# Patient Record
Sex: FEMALE | Race: ASIAN | NOT HISPANIC OR LATINO | Employment: FULL TIME | URBAN - METROPOLITAN AREA
[De-identification: names, ages, dates, MRNs, and addresses within clinical notes are randomized per-mention and may not be internally consistent; named-entity substitution may affect disease eponyms.]

---

## 2020-03-29 ENCOUNTER — VIRTUAL VISIT (OUTPATIENT)
Dept: FAMILY MEDICINE | Facility: OTHER | Age: 25
End: 2020-03-29

## 2020-03-29 NOTE — PROGRESS NOTES
"Date: 2020 11:56:07  Clinician: EVERARDO Montgomery  Clinician NPI: [id number]  Patient: Pollo Paige  Patient : 1995  Patient Address: [address] [place] [address], [address], [place] [address]  Patient Phone: [phone]  Visit Protocol: UTI  Patient Summary:  Pollo is a 25 year old ( : 1995 ) female who initiated a Visit for a presumed bladder infection. When asked the question \"Please sign me up to receive news, health information and promotions from [employer].\", Pollo responded \"Yes\".   A synchronous visit is necessary because the patient reported the following abnormal symptoms:   Hematuria and not menstruating (requires clarification)   Her symptoms started 1-3 days ago and consist of dysuria, abdominal pain, urinary frequency, and urinary incontinence.   Symptom details     Urine color: The color of her urine is pink or red. She is not currently menstruating.     Abdominal pain: The pain is moderate (4-6 on a 10 point pain scale).      Denied symptoms include foul-smelling urine, nausea, feeling as if the bladder is never empty, flank pain, chills, vaginal discharge, urinary urgency, vomiting, and vaginal itching. She does not feel feverish.   Pollo has not used any over-the-counter medications or home remedies to relieve her current symptoms.  Precipitating events  Pollo denies having a sexually transmitted disease.  Pertinent medical history  Pollo has had a bladder infection before and has had 1 in the past 12 months. Her most recent bladder infection was not within the last 4 weeks. Her current symptoms are similar to her previous bladder infection symptoms.   She is not sure what antibiotics have been effective in treating her past bladder infections.   Pollo has not been prescribed antibiotics to prevent frequent or repeated bladder infections in the past and does not get yeast infections when she takes antibiotics. She has not experienced problems or side effects " with any of the common antibiotics used to treat bladder infections.   Pollo does not have a history of kidney stones. She has not used a catheter or been a patient in a hospital or nursing home in the past 2 weeks.   Pollo does not smoke or use smokeless tobacco.   She denies pregnancy and denies breastfeeding. She has menstruated in the past month.   Additional information as reported by the patient (free text): pain and bleeding when urinating; I had a similar problem in July 2019(I went to the provider after 4-5 days then, the infection had gone by then), I had an yeast infection in   October 2019.     MEDICATIONS: No current medications, ALLERGIES: NKDA  Clinician Response:  Dear Pollo,  Based on the information you have provided, you likely have an acute urinary tract infection, also called a bladder infection. Bladder infections occur when bacteria from the outside of the body enters the urinary tract. Any part of the urinary system can be infected, but the bladder is the most common.  Medication information  I am prescribing:     Nitrofurantoin monohyd/m-cryst (Macrobid) 100 mg oral capsule. Take 1 capsule by mouth every 12 hours for 5 days. Take this medication with food. There are no refills with this prescription.   The medication I prescribed for your bladder infection is an antibiotic. Continue taking the medication until it is gone even if you feel better.   Yeast infections can be a common side effect of antibiotics. The most common symptom of a yeast infection is itchiness in and around the vagina. Other signs and symptoms include burning, redness, or a thick, white vaginal discharge that looks like cottage cheese and does not have a bad smell.  Self care  Urination helps to flush bacteria from the urinary tract. For this reason, drinking water and urinating often helps relieve some urinary symptoms and can decrease your risk of getting bladder infections in the future.  Other steps you can  take to prevent future bladder infections include:     Wipe front to back after using the bathroom    Urinate after sexual intercourse    Avoid using deodorant sprays, douches, or powders in the vaginal area     When to seek care  Please make an appointment to be seen in a clinic or urgent care if any of the following occur:     You develop new symptoms or your symptoms become worse    You have medication side effects that make it difficult to take them as prescribed    Your symptoms do not improve within 1-2 days of starting treatment    You have symptoms of a bladder infection that return shortly after completing treatment     It is possible to have an allergic reaction to an antibiotic even if you have not had one in the past. If you notice a new rash, significant swelling, or difficulty breathing, stop taking this medication immediately and go to a clinic or urgent care.   Diagnosis: Acute uncomplicated bladder infection  Diagnosis ICD: N39.0  Triage Notes: I reviewed the patient's history, verified their identity, and explained the Visit process.    Maybe 1-2 drops of blood. abdominal pain in low abdomen center and slightly off to the right. No history of abdominal surgery. No chance of STD's or pregnancy per patient. Should be having period in next 1-3 days. Discussed that if symptoms worsen or not improved over next 48 hours  should be seen. No history of kidney stones.  Synchronous Triage: phone, status: completed, duration: 352 seconds  Prescription: nitrofurantoin monohyd/m-cryst (Macrobid) 100 mg oral capsule 10 capsule, 5 days supply. Take 1 capsule by mouth every 12 hours for 5 days. Refills: 0, Refill as needed: no, Allow substitutions: yes  Pharmacy: [facility]/pharmacy #[id number] - [phone] - [address] [place] [address]

## 2020-10-12 ENCOUNTER — MEDICAL CORRESPONDENCE (OUTPATIENT)
Dept: HEALTH INFORMATION MANAGEMENT | Facility: CLINIC | Age: 25
End: 2020-10-12

## 2020-10-14 ENCOUNTER — ANCILLARY PROCEDURE (OUTPATIENT)
Dept: MAMMOGRAPHY | Facility: CLINIC | Age: 25
End: 2020-10-14
Attending: OBSTETRICS & GYNECOLOGY
Payer: COMMERCIAL

## 2020-10-14 DIAGNOSIS — N63.15 BREAST LUMP ON RIGHT SIDE AT 3 O'CLOCK POSITION: ICD-10-CM

## 2020-10-14 PROCEDURE — 76642 ULTRASOUND BREAST LIMITED: CPT | Mod: RT | Performed by: STUDENT IN AN ORGANIZED HEALTH CARE EDUCATION/TRAINING PROGRAM

## 2020-10-15 DIAGNOSIS — R92.8 ABNORMAL FINDING ON BREAST IMAGING: Primary | ICD-10-CM

## 2020-10-17 ENCOUNTER — NURSE TRIAGE (OUTPATIENT)
Dept: NURSING | Facility: CLINIC | Age: 25
End: 2020-10-17

## 2020-10-17 DIAGNOSIS — R92.8 ABNORMAL FINDING ON BREAST IMAGING: ICD-10-CM

## 2020-10-17 LAB
SARS-COV-2 RNA SPEC QL NAA+PROBE: NOT DETECTED
SPECIMEN SOURCE: NORMAL

## 2020-10-17 PROCEDURE — 99000 SPECIMEN HANDLING OFFICE-LAB: CPT | Performed by: PATHOLOGY

## 2020-10-17 PROCEDURE — U0003 INFECTIOUS AGENT DETECTION BY NUCLEIC ACID (DNA OR RNA); SEVERE ACUTE RESPIRATORY SYNDROME CORONAVIRUS 2 (SARS-COV-2) (CORONAVIRUS DISEASE [COVID-19]), AMPLIFIED PROBE TECHNIQUE, MAKING USE OF HIGH THROUGHPUT TECHNOLOGIES AS DESCRIBED BY CMS-2020-01-R: HCPCS | Mod: 90 | Performed by: PATHOLOGY

## 2020-10-17 NOTE — TELEPHONE ENCOUNTER
Pt is calling in to get the results of her Covid test that was done today. Results are still in process. Pt verbalized understanding, and will call back later.    .Mahendra Coto RN on 10/17/2020 at 2:30 PM

## 2020-10-18 ENCOUNTER — NURSE TRIAGE (OUTPATIENT)
Dept: NURSING | Facility: CLINIC | Age: 25
End: 2020-10-18

## 2020-10-18 NOTE — TELEPHONE ENCOUNTER
Looking for Covid results in preparation for procedure on Tuesday - advised patient test was negative.      Coronavirus (COVID-19) Notification    Lab Result   Lab test 2019-nCoV rRt-PCR OR SARS-COV-2 PCR    Nasopharyngeal AND/OR Oropharyngeal swab is NEGATIVE for 2019-nCoV RNA [OR] SARS-COV-2 RNA (COVID-19) RNA    Your result was negative. This means that we didn't find the virus that causes COVID-19 in your sample. A test may show negative when you do actually have the virus. This can happen when the virus is in the early stages of infection, before you feel illness symptoms.    If you have symptoms   Stay home and away from others (self-isolate) until you meet ALL of the guidelines below:    You've had no fever--and no medicine that reduces fever--for 1 full day (24 hours). And      Your other symptoms have gotten better. For example, your cough or breathing has improved. And   ; At least 10 days have passed since your symptoms started. (If you've been told by a doctor that you have a weak immune system, wait 20 days.)         During this time:    Stay home. Don't go to work, school or anywhere else.     Stay in your own room, including for meals. Use your own bathroom if you can.    Stay away from others in your home. No hugging, kissing or shaking hands. No visitors.    Clean  high touch  surfaces often (doorknobs, counters, handles, etc.). Use a household cleaning spray or wipes. You can find a full list on the EPA website at www.epa.gov/pesticide-registration/list-n-disinfectants-use-against-sars-cov-2.    Cover your mouth and nose with a mask, tissue or other face covering to avoid spreading germs.    Wash your hands and face often with soap and water.    Going back to work  Check with your employer for any guidelines to follow for going back to work.  You are sent a letter for your Employer which will serve as formal document notice that you, the employee, tested negative for COVID-19, as of the testing  date shown above.    If your symptoms worsen or other concerning symptoms, contact PCP, oncare or consider returning to Emergency Dept.    Where can I get more information?     [employer] [facility]: [email or web address]    Coronavirus Basics: www.health.[facility].[place]/diseases/coronavirus/basics.html    [facility] Hotline ([phone])    Beverly Mathews RN on 10/18/2020 at 4:25 PM    Reason for Disposition    Caller requesting lab results    Additional Information    Negative: Lab calling with strep throat test results and triager can call in prescription    Negative: Lab calling with urinalysis test results and triager can call in prescription    Negative: Medication questions    Negative: ED call to PCP    Negative: Physician call to PCP    Negative: Call about patient who is currently hospitalized    Negative: Lab or radiology calling with CRITICAL test results    Negative: [1] Prescription not at pharmacy AND [2] was prescribed today by PCP    Negative: [1] Follow-up call from patient regarding patient's clinical status AND [2] information urgent    Negative: [1] Caller requests to speak ONLY to PCP AND [2] urgent question    Negative: [1] Caller requests to speak to PCP now AND [2] won't tell us reason for call  (Exception: if 10 pm to 6 am, caller must first discuss reason for the call)    Negative: Notification of hospital admission    Negative: Notification of death    Protocols used: [facility] - [facility]

## 2020-10-20 ENCOUNTER — ANCILLARY PROCEDURE (OUTPATIENT)
Dept: MAMMOGRAPHY | Facility: CLINIC | Age: 25
End: 2020-10-20
Attending: OBSTETRICS & GYNECOLOGY
Payer: COMMERCIAL

## 2020-10-20 DIAGNOSIS — N63.15 BREAST LUMP ON RIGHT SIDE AT 3 O'CLOCK POSITION: ICD-10-CM

## 2020-10-20 PROCEDURE — 19083 BX BREAST 1ST LESION US IMAG: CPT | Mod: RT | Performed by: RADIOLOGY

## 2020-10-20 PROCEDURE — 88305 TISSUE EXAM BY PATHOLOGIST: CPT | Performed by: PATHOLOGY

## 2020-10-25 LAB — COPATH REPORT: NORMAL

## 2020-10-26 ENCOUNTER — TELEPHONE (OUTPATIENT)
Dept: MAMMOGRAPHY | Facility: CLINIC | Age: 25
End: 2020-10-26

## 2020-10-26 NOTE — TELEPHONE ENCOUNTER
Spoke to Pollo about the benign finding of a fibroadenoma found during her breast biopsy done last week.  We discussed the Radiologist's recommendation of clinical follow up.  Pollo verbalized understanding and all questions and concerns were answered at this time.

## 2020-11-13 ENCOUNTER — ANCILLARY PROCEDURE (OUTPATIENT)
Dept: ULTRASOUND IMAGING | Facility: CLINIC | Age: 25
End: 2020-11-13
Attending: OBSTETRICS & GYNECOLOGY
Payer: COMMERCIAL

## 2020-11-13 DIAGNOSIS — R10.31 RIGHT LOWER QUADRANT ABDOMINAL PAIN: ICD-10-CM

## 2020-11-13 PROCEDURE — 76856 US EXAM PELVIC COMPLETE: CPT | Mod: GC

## 2020-11-13 PROCEDURE — 76830 TRANSVAGINAL US NON-OB: CPT | Mod: GC

## 2021-01-04 ENCOUNTER — HEALTH MAINTENANCE LETTER (OUTPATIENT)
Age: 26
End: 2021-01-04

## 2021-10-10 ENCOUNTER — HEALTH MAINTENANCE LETTER (OUTPATIENT)
Age: 26
End: 2021-10-10

## 2022-01-30 ENCOUNTER — HEALTH MAINTENANCE LETTER (OUTPATIENT)
Age: 27
End: 2022-01-30

## 2022-07-21 ENCOUNTER — TELEPHONE (OUTPATIENT)
Dept: BEHAVIORAL HEALTH | Facility: CLINIC | Age: 27
End: 2022-07-21

## 2022-07-24 ASSESSMENT — ANXIETY QUESTIONNAIRES
7. FEELING AFRAID AS IF SOMETHING AWFUL MIGHT HAPPEN: SEVERAL DAYS
7. FEELING AFRAID AS IF SOMETHING AWFUL MIGHT HAPPEN: SEVERAL DAYS
IF YOU CHECKED OFF ANY PROBLEMS ON THIS QUESTIONNAIRE, HOW DIFFICULT HAVE THESE PROBLEMS MADE IT FOR YOU TO DO YOUR WORK, TAKE CARE OF THINGS AT HOME, OR GET ALONG WITH OTHER PEOPLE: EXTREMELY DIFFICULT
GAD7 TOTAL SCORE: 16
4. TROUBLE RELAXING: NEARLY EVERY DAY
2. NOT BEING ABLE TO STOP OR CONTROL WORRYING: MORE THAN HALF THE DAYS
6. BECOMING EASILY ANNOYED OR IRRITABLE: NEARLY EVERY DAY
5. BEING SO RESTLESS THAT IT IS HARD TO SIT STILL: MORE THAN HALF THE DAYS
3. WORRYING TOO MUCH ABOUT DIFFERENT THINGS: NEARLY EVERY DAY
8. IF YOU CHECKED OFF ANY PROBLEMS, HOW DIFFICULT HAVE THESE MADE IT FOR YOU TO DO YOUR WORK, TAKE CARE OF THINGS AT HOME, OR GET ALONG WITH OTHER PEOPLE?: EXTREMELY DIFFICULT
GAD7 TOTAL SCORE: 16
1. FEELING NERVOUS, ANXIOUS, OR ON EDGE: MORE THAN HALF THE DAYS

## 2022-07-25 ENCOUNTER — HOSPITAL ENCOUNTER (OUTPATIENT)
Dept: BEHAVIORAL HEALTH | Facility: CLINIC | Age: 27
Discharge: HOME | End: 2022-07-25
Attending: FAMILY MEDICINE
Payer: COMMERCIAL

## 2022-07-25 ENCOUNTER — TELEPHONE (OUTPATIENT)
Dept: BEHAVIORAL HEALTH | Facility: CLINIC | Age: 27
End: 2022-07-25

## 2022-07-25 PROCEDURE — 999N000216 HC STATISTIC ADULT CD FACE TO FACE-NO CHRG: Mod: GT,95 | Performed by: PSYCHOLOGIST

## 2022-07-25 RX ORDER — DROSPIRENONE AND ETHINYL ESTRADIOL 0.02-3(28)
1 KIT ORAL DAILY
COMMUNITY

## 2022-07-25 ASSESSMENT — COLUMBIA-SUICIDE SEVERITY RATING SCALE - C-SSRS
4. HAVE YOU HAD THESE THOUGHTS AND HAD SOME INTENTION OF ACTING ON THEM?: NO
2. HAVE YOU ACTUALLY HAD ANY THOUGHTS OF KILLING YOURSELF IN THE PAST MONTH?: NO
5. HAVE YOU STARTED TO WORK OUT OR WORKED OUT THE DETAILS OF HOW TO KILL YOURSELF? DO YOU INTEND TO CARRY OUT THIS PLAN?: NO
1. IN THE PAST MONTH, HAVE YOU WISHED YOU WERE DEAD OR WISHED YOU COULD GO TO SLEEP AND NOT WAKE UP?: NO
6. HAVE YOU EVER DONE ANYTHING, STARTED TO DO ANYTHING, OR PREPARED TO DO ANYTHING TO END YOUR LIFE?: NO
3. HAVE YOU BEEN THINKING ABOUT HOW YOU MIGHT KILL YOURSELF?: NO

## 2022-07-25 ASSESSMENT — PATIENT HEALTH QUESTIONNAIRE - PHQ9: SUM OF ALL RESPONSES TO PHQ QUESTIONS 1-9: 4

## 2022-07-25 NOTE — PATIENT INSTRUCTIONS
Dear Pollo,    This is a reminder that an appointment for mental health services has been scheduled for you.   Please contact your insurance company directly to verify coverage, eligibility, payment, and   benefit information.    Appointment Date: 7/28/2022  Appointment Time: 5:00 PM  Location: Clinical and Developmental [facility]  [employer] [facility]  [address]  [address]  [place] [address]  [phone]    For directions and/or questions regarding the appointment, please contact the clinic or provider   directly at the phone number listed above.    Your appointment is scheduled outside of [facility]. Behavioral Healthcare   Providers ([facility]) maintains an extensive network of licensed behavioral health providers to   connect patients with the services they need. We do not charge providers a fee to participate   in our referral network. We match patients with providers based on a patient s specific   treatment needs, insurance coverage, and location. Our first effort will be to refer you to a   provider within your care system and will utilize providers outside of your care system as   needed.     Sincerely,    [facility] Behavioral Health Access Staff  [facility]  [address] [place] [id number]  [phone]

## 2022-07-25 NOTE — PROGRESS NOTES
"[facility] Mental Health and Addiction Assessment [facility]    ADULT Mental Health Assessment Appointment Note    PATIENT'S NAME:  Pollo Paige    Preferred Pronoun: She her  MRN: [id number]  YOB: 1995  Address:  [address], [address]  [facility] [address]  PREFERRED PHONE: [phone]  May we leave a referral related message: Yes    DATE OF SERVICE: 22  START TIME: 08  END TIME: 08  Declines to give Emergency Contact    SERVICE MODALITY:  Video Visit:      Provider verified identity through the following two step process.  Patient provided:  Patient  and Patient address    Telemedicine Visit: The patient's condition can be safely assessed and treated via synchronous audio and visual telemedicine encounter.      Reason for Telemedicine Visit: Services only offered telehealth    Originating Site (Patient Location): Patient's home    Distant Site (Provider Location): [facility] HEALTH & ADDI[facility] SERVICES    Consent:  The patient/guardian has verbally consented to: the potential risks and benefits of telemedicine (video visit) versus in person care; bill my insurance or make self-payment for services provided; and responsibility for payment of non-covered services.     Patient would like the video invitation sent by:  My Chart    Mode of Communication:  Video Conference via [employer]    As the provider I attest to compliance with applicable laws and regulations related to telemedicine.    Identifying Information:  Patient is a 27 year old, individual .  Patient was referred for an assessment by self.  Patient attended the session alone. Patient identified their preferred language to be English. Patient reported they do not need the assistance of an  or other support involved in therapy.     Services Requested:   The reason for seeking services at this time is: \" anger issues, emotional outbursts. \"  Pt is a 27 year old female who self refers due to anxiety / " angry outbursts typically sparked by conflict with her boyfriend.  She stated she noticed her anxiety became worse last fall and she is now seeking help as it is effecting her mood, contributing to increase in irritability.  She does not like her recent behaviors.  Pt reports she has always been prone to anger even as a child; but as an adult, it is not sitting well with her and she would like help to address this.  Patient has not attempted to resolve these concerns in the past other than a few online counseling sessions she did not continue with last May.  Pt is hoping to get a referral for individual therapy.  She finds her irritability is getting triggered by her relationship with her boyfriend.   Patient does not have legal concerns.    Mental Health:  Review of Symptoms per patient report:  Depression: Excessive or inappropriate guilt, Difficulties concentrating and Low self-worth  Teresa: No Symptoms  Psychosis: No Symptoms  Anxiety: Excessive worry, Nervousness, Ruminations, Poor concentration, Irritability and Anger outbursts  Panic: Palpitations, Tremors, Shortness of breath and had three to four panic attacks this year.    Triggered by arguement with her boyfriend  Post Traumatic Stress Disorder: Experienced traumatic event death of her grandmother when pt was 14 was identifed by pt as traumatic  Eating Disorder: No Symptoms  ADD / ADHD: No symptoms  Conduct Disorder: No symptoms  Autism Spectrum Disorder: No symptoms  Obsessive Compulsive Disorder: No Symptoms    Substance Use:  Do you  have a history of alcohol or illicit drug use? No and doesn't use substances, cage of 0.  No hx of requiring intervention of any kind.      Significant Losses / Trauma / Abuse / Neglect Issues:   Patient did not serve in the .  There are indications or report of significant loss, trauma, abuse or neglect issues related to: are no indications and client denies any losses, trauma, abuse, or neglect  concerns.  Concerns for possible neglect are not present.     Medical History:  Patient reports no current medical concerns.  Patient denies any issues with pain.   There are not significant appetite / nutritional concerns / weight changes.   Patient does not report a history of head injury / trauma / cognitive impairment.      Current Mental Status Exam:   Appearance:  Appropriate    Eye Contact:  Good   Psychomotor:  Normal   Attitude / Demeanor: Cooperative  Interested Pleasant  Speech Rate:  Normal/ Responsive  Volume:  Normal  volume  Language:  intact  Mood:   Normal  Affect:   Appropriate    Thought Content: Clear   Thought Process: Goal Directed  Logical     Associations:  No loosening of associations  Insight:   Good   Judgment:  Intact   Orientation:  All  Attention:  Good    Rating Scales:  PHQ9:    PHQ-9 SCORE 7/25/2022   PHQ-9 Total Score 4   ;    GAD7:    MANOLO-7 SCORE 7/24/2022   Total Score 16 (severe anxiety)   Total Score 16       Safety Assessment:   Current Safety Concerns:  Arroyo Suicide Severity Rating Scale (Lifetime/Recent)  Arroyo Suicide Severity Rating (Lifetime/Recent) 7/25/2022   Q1 Wished to be Dead (Past Month) no   Q2 Suicidal Thoughts (Past Month) no   Q3 Suicidal Thought Method no   Q4 Suicidal Intent without Specific Plan no   Q5 Suicide Intent with Specific Plan no   Q6 Suicide Behavior (Lifetime) no   Level of Risk per Screen low risk     Patient denies current homicidal ideation and behaviors.  Patient reports hx of  self-injurious ideation.  intensity: low, superficial cuts.  Client reports they are not currently engaging in self-injurious behaivor. And last superficial cut was May of this year 2022  Patient denied risk behaviors associated with substance use.  Patient denies any high risk behaviors associated with mental health symptoms.  Patient reports the following current concerns for their personal safety: None.  Patient reports there are not  firearms in the house.  There are no firearms in the home..     Clinical Impressions:  Generalized Anxiety Disorder  A. Excessive anxiety and worry about a number of events or activities (such as work or school performance).   B. The person finds it difficult to control the worry.  C. Select 3 or more symptoms (required for diagnosis). Only one item is required in children.   - Restlessness or feeling keyed up or on edge.    - Difficulty concentrating or mind going blank.    - Irritability.   D. The focus of the anxiety and worry is not confined to features of an Axis I disorder.  E. The anxiety, worry, or physical symptoms cause clinically significant distress or impairment in social, occupational, or other important areas of functioning.   F. The disturbance is not due to the direct physiological effects of a substance (e.g., a drug of abuse, a medication) or a general medical condition (e.g., hyperthyroidism) and does not occur exclusively during a Mood Disorder, a Psychotic Disorder, or a Pervasive Developmental Disorder.    Therapeutic Interventions Provided: supportive active listening, explored alternatives, provided Psychoeducational support, emotional validation and identified problem solving techniques     Recommendations/Plan: pt is a 27 year old female who self refers due to anxiety / angry outbursts typically sparked by conflict with her boyfriend.  She stated she noticed her anxiety became worse last fall and she is now seeking help as it is effecting her mood, contributing to increase in irritability.  She denies depressed mood, and denies hx of depression.  She does not like her recent behaviors.  Pt reports she has always been prone to anger but as an adult, it is not sitting well with her and she would like help to address this.  Pt is taking no medication and she has never required acute care.  There is no evidence of cognitive disturbance.  She is employed full time, has a graduate degree and is otherwise functioning  well.  No appetite or sleep disturbance, no medical concerns.  Pt was recommended for OP LOC and she accepted an intake appointment as listed below.  Pt denies SI, has no hx of attempts, or plans.  She has engaged in superficial SIB and has done this quite sporadically since adolescence.  She wants help to address her anxiety, angry outbursts at this time.    Referrals: Date: Thursday, 7/28/2022  Time: 5:00 pm - 6:00 pm  Provider: Atif Gunderson  PhD  LP  Location: Clinical and Developmental Services [facility], [address] [place] [address], [address], [place] [address]  Phone: [phone]  Type: Teletherapy    Radha Liang, [facility],  July 25, 2022  Mental Health and Addiction Services Assessment Center

## 2022-07-25 NOTE — TELEPHONE ENCOUNTER
Patient has a virtual  eval today, 7/25/2022 at 0800. Spoke with patient and checked them in for this appointment. Patient declined to provide an emergency contact at this time, email verified. Appointment will be done via [employer] video.

## 2022-09-24 ENCOUNTER — HEALTH MAINTENANCE LETTER (OUTPATIENT)
Age: 27
End: 2022-09-24

## 2023-03-02 ENCOUNTER — OFFICE VISIT (OUTPATIENT)
Dept: URGENT CARE | Facility: URGENT CARE | Age: 28
End: 2023-03-02
Payer: COMMERCIAL

## 2023-03-02 VITALS
HEART RATE: 73 BPM | DIASTOLIC BLOOD PRESSURE: 72 MMHG | OXYGEN SATURATION: 98 % | RESPIRATION RATE: 14 BRPM | TEMPERATURE: 100 F | WEIGHT: 148 LBS | SYSTOLIC BLOOD PRESSURE: 118 MMHG

## 2023-03-02 DIAGNOSIS — J02.9 ACUTE VIRAL PHARYNGITIS: Primary | ICD-10-CM

## 2023-03-02 LAB — DEPRECATED S PYO AG THROAT QL EIA: NEGATIVE

## 2023-03-02 PROCEDURE — 87651 STREP A DNA AMP PROBE: CPT | Performed by: PHYSICIAN ASSISTANT

## 2023-03-02 PROCEDURE — 99203 OFFICE O/P NEW LOW 30 MIN: CPT | Performed by: PHYSICIAN ASSISTANT

## 2023-03-02 ASSESSMENT — ENCOUNTER SYMPTOMS
RHINORRHEA: 0
VOMITING: 0
COUGH: 1
SORE THROAT: 1
CHILLS: 1
FEVER: 1
DIARRHEA: 0

## 2023-03-02 NOTE — PROGRESS NOTES
Assessment & Plan:        ICD-10-CM    1. Acute viral pharyngitis  J02.9 Streptococcus A Rapid Screen w/Reflex to PCR - Clinic Collect     Group A Streptococcus PCR Throat Swab            Plan/Clinical Decision Making:    Patient with acute ST with mild cough. Negative strep test, PCR pending.   Declines covid testing today.   Rest, fluids, ibuprofen, Tylenol as needed.       Return if symptoms worsen or fail to improve, for in 5-7 days.     At the end of the encounter, I discussed results, diagnosis, medications. Discussed red flags for immediate return to clinic/ER, as well as indications for follow up if no improvement. Patient understood and agreed to plan. Patient was stable for discharge.        Janay Hernandez PA-C on 3/2/2023 at 5:35 PM          Subjective:     HPI:    Pollo is a 28 year old female who presents to clinic today for the following health issues:  Chief Complaint   Patient presents with     Sick     Slight cough  x 3 days --ST on right side, ear pain, temp x yesterday --- took Ibuprofen at 130pm today  - did a Virtual visit this AM and was told to come in - was with [facility] - its in care everywhere     HPI    Patient with ST, right ear pain, pain with swallowing. Started yesterday. Fever- 100-101  Slight cough.   No covid testing done. Declines testing today.     History obtained from the patient.    Review of Systems   Constitutional: Positive for chills and fever.   HENT: Positive for ear pain and sore throat. Negative for congestion and rhinorrhea.    Respiratory: Positive for cough (mild).    Gastrointestinal: Negative for diarrhea and vomiting.         There is no problem list on file for this patient.       History reviewed. No pertinent past medical history.    Social History     Tobacco Use     Smoking status: Never     Smokeless tobacco: Never   Substance Use Topics     Alcohol use: Not Currently             Objective:     Vitals:    03/02/23 1727   BP: 118/72   BP  Location: Right arm   Patient Position: Chair   Cuff Size: Adult Regular   Pulse: 73   Resp: 14   Temp: 100  F (37.8  C)   TempSrc: Oral   SpO2: 98%   Weight: 67.1 kg (148 lb)         Physical Exam   EXAM:   Pleasant, alert, appropriate appearance. NAD.  Head Exam: Normocephalic, atraumatic.  Eye Exam:   non icteric/injection.    Ear Exam: TMs grey without bulging. Normal canals.  Normal pinna.  Nose Exam: Normal external nose.    OroPharynx Exam:  Moist mucous membranes. mild erythema, pharynx without exudate or hypertrophy.  Neck/Thyroid Exam:  Neck adenopathy with tenderness  Chest/Respiratory Exam: CTAB.  Cardiovascular Exam: RRR. No murmur or rubs.      Results:  Results for orders placed or performed in visit on 03/02/23   Streptococcus A Rapid Screen w/Reflex to PCR - Clinic Collect     Status: Normal    Specimen: Throat; Swab   Result Value Ref Range    Group A Strep antigen Negative Negative

## 2023-03-03 LAB — GROUP A STREP BY PCR: NOT DETECTED

## 2023-05-08 ENCOUNTER — HEALTH MAINTENANCE LETTER (OUTPATIENT)
Age: 28
End: 2023-05-08

## 2024-07-14 ENCOUNTER — HEALTH MAINTENANCE LETTER (OUTPATIENT)
Age: 29
End: 2024-07-14

## 2025-07-19 ENCOUNTER — HEALTH MAINTENANCE LETTER (OUTPATIENT)
Age: 30
End: 2025-07-19